# Patient Record
Sex: MALE | Race: WHITE | NOT HISPANIC OR LATINO | Employment: OTHER | URBAN - METROPOLITAN AREA
[De-identification: names, ages, dates, MRNs, and addresses within clinical notes are randomized per-mention and may not be internally consistent; named-entity substitution may affect disease eponyms.]

---

## 2024-04-24 ENCOUNTER — HOSPITAL ENCOUNTER (EMERGENCY)
Facility: HOSPITAL | Age: 74
Discharge: HOME | End: 2024-04-24
Attending: EMERGENCY MEDICINE | Admitting: EMERGENCY MEDICINE
Payer: MEDICARE

## 2024-04-24 ENCOUNTER — APPOINTMENT (OUTPATIENT)
Dept: CT IMAGING | Facility: HOSPITAL | Age: 74
End: 2024-04-24
Payer: MEDICARE

## 2024-04-24 ENCOUNTER — APPOINTMENT (OUTPATIENT)
Dept: GENERAL RADIOLOGY | Facility: HOSPITAL | Age: 74
End: 2024-04-24
Payer: MEDICARE

## 2024-04-24 VITALS
TEMPERATURE: 97.7 F | SYSTOLIC BLOOD PRESSURE: 135 MMHG | RESPIRATION RATE: 18 BRPM | DIASTOLIC BLOOD PRESSURE: 87 MMHG | WEIGHT: 170 LBS | HEIGHT: 71 IN | BODY MASS INDEX: 23.8 KG/M2 | HEART RATE: 85 BPM | OXYGEN SATURATION: 98 %

## 2024-04-24 DIAGNOSIS — R55 NEAR SYNCOPE: Primary | ICD-10-CM

## 2024-04-24 DIAGNOSIS — N17.9 ACUTE KIDNEY INJURY: ICD-10-CM

## 2024-04-24 LAB
ALBUMIN SERPL-MCNC: 3.9 G/DL (ref 3.5–5.2)
ALBUMIN/GLOB SERPL: 1.3 G/DL
ALP SERPL-CCNC: 69 U/L (ref 39–117)
ALT SERPL W P-5'-P-CCNC: 9 U/L (ref 1–41)
ANION GAP SERPL CALCULATED.3IONS-SCNC: 10 MMOL/L (ref 5–15)
AST SERPL-CCNC: 16 U/L (ref 1–40)
BACTERIA UR QL AUTO: ABNORMAL /HPF
BASOPHILS # BLD AUTO: 0.05 10*3/MM3 (ref 0–0.2)
BASOPHILS NFR BLD AUTO: 0.4 % (ref 0–1.5)
BILIRUB SERPL-MCNC: 0.3 MG/DL (ref 0–1.2)
BILIRUB UR QL STRIP: NEGATIVE
BUN SERPL-MCNC: 33 MG/DL (ref 8–23)
BUN/CREAT SERPL: 19.1 (ref 7–25)
CALCIUM SPEC-SCNC: 9.9 MG/DL (ref 8.6–10.5)
CHLORIDE SERPL-SCNC: 108 MMOL/L (ref 98–107)
CLARITY UR: CLEAR
CO2 SERPL-SCNC: 25 MMOL/L (ref 22–29)
COLOR UR: YELLOW
CREAT SERPL-MCNC: 1.73 MG/DL (ref 0.76–1.27)
DEPRECATED RDW RBC AUTO: 44.8 FL (ref 37–54)
EGFRCR SERPLBLD CKD-EPI 2021: 41.7 ML/MIN/1.73
EOSINOPHIL # BLD AUTO: 0.02 10*3/MM3 (ref 0–0.4)
EOSINOPHIL NFR BLD AUTO: 0.2 % (ref 0.3–6.2)
ERYTHROCYTE [DISTWIDTH] IN BLOOD BY AUTOMATED COUNT: 12.3 % (ref 12.3–15.4)
GLOBULIN UR ELPH-MCNC: 3.1 GM/DL
GLUCOSE SERPL-MCNC: 117 MG/DL (ref 65–99)
GLUCOSE UR STRIP-MCNC: NEGATIVE MG/DL
HCT VFR BLD AUTO: 40.8 % (ref 37.5–51)
HGB BLD-MCNC: 12.8 G/DL (ref 13–17.7)
HGB UR QL STRIP.AUTO: ABNORMAL
HOLD SPECIMEN: NORMAL
HYALINE CASTS UR QL AUTO: ABNORMAL /LPF
IMM GRANULOCYTES # BLD AUTO: 0.08 10*3/MM3 (ref 0–0.05)
IMM GRANULOCYTES NFR BLD AUTO: 0.6 % (ref 0–0.5)
KETONES UR QL STRIP: ABNORMAL
LEUKOCYTE ESTERASE UR QL STRIP.AUTO: NEGATIVE
LYMPHOCYTES # BLD AUTO: 1.01 10*3/MM3 (ref 0.7–3.1)
LYMPHOCYTES NFR BLD AUTO: 7.8 % (ref 19.6–45.3)
MAGNESIUM SERPL-MCNC: 2.1 MG/DL (ref 1.6–2.4)
MCH RBC QN AUTO: 31.1 PG (ref 26.6–33)
MCHC RBC AUTO-ENTMCNC: 31.4 G/DL (ref 31.5–35.7)
MCV RBC AUTO: 99 FL (ref 79–97)
MONOCYTES # BLD AUTO: 1.06 10*3/MM3 (ref 0.1–0.9)
MONOCYTES NFR BLD AUTO: 8.2 % (ref 5–12)
NEUTROPHILS NFR BLD AUTO: 10.68 10*3/MM3 (ref 1.7–7)
NEUTROPHILS NFR BLD AUTO: 82.8 % (ref 42.7–76)
NITRITE UR QL STRIP: NEGATIVE
NRBC BLD AUTO-RTO: 0 /100 WBC (ref 0–0.2)
PH UR STRIP.AUTO: 6 [PH] (ref 5–8)
PLATELET # BLD AUTO: 247 10*3/MM3 (ref 140–450)
PMV BLD AUTO: 9.6 FL (ref 6–12)
POTASSIUM SERPL-SCNC: 4.8 MMOL/L (ref 3.5–5.2)
PROT SERPL-MCNC: 7 G/DL (ref 6–8.5)
PROT UR QL STRIP: ABNORMAL
QT INTERVAL: 346 MS
QTC INTERVAL: 448 MS
RBC # BLD AUTO: 4.12 10*6/MM3 (ref 4.14–5.8)
RBC # UR STRIP: ABNORMAL /HPF
REF LAB TEST METHOD: ABNORMAL
SODIUM SERPL-SCNC: 143 MMOL/L (ref 136–145)
SP GR UR STRIP: 1.02 (ref 1–1.03)
SQUAMOUS #/AREA URNS HPF: ABNORMAL /HPF
TROPONIN T SERPL HS-MCNC: 21 NG/L
UROBILINOGEN UR QL STRIP: ABNORMAL
WBC # UR STRIP: ABNORMAL /HPF
WBC NRBC COR # BLD AUTO: 12.9 10*3/MM3 (ref 3.4–10.8)
WHOLE BLOOD HOLD COAG: NORMAL
WHOLE BLOOD HOLD SPECIMEN: NORMAL

## 2024-04-24 PROCEDURE — 71045 X-RAY EXAM CHEST 1 VIEW: CPT

## 2024-04-24 PROCEDURE — 36415 COLL VENOUS BLD VENIPUNCTURE: CPT

## 2024-04-24 PROCEDURE — 81001 URINALYSIS AUTO W/SCOPE: CPT

## 2024-04-24 PROCEDURE — 84484 ASSAY OF TROPONIN QUANT: CPT

## 2024-04-24 PROCEDURE — 70450 CT HEAD/BRAIN W/O DYE: CPT

## 2024-04-24 PROCEDURE — 93005 ELECTROCARDIOGRAM TRACING: CPT | Performed by: EMERGENCY MEDICINE

## 2024-04-24 PROCEDURE — 85025 COMPLETE CBC W/AUTO DIFF WBC: CPT

## 2024-04-24 PROCEDURE — 99284 EMERGENCY DEPT VISIT MOD MDM: CPT

## 2024-04-24 PROCEDURE — 83735 ASSAY OF MAGNESIUM: CPT

## 2024-04-24 PROCEDURE — 93005 ELECTROCARDIOGRAM TRACING: CPT

## 2024-04-24 PROCEDURE — 80053 COMPREHEN METABOLIC PANEL: CPT

## 2024-04-24 RX ORDER — SODIUM CHLORIDE 0.9 % (FLUSH) 0.9 %
10 SYRINGE (ML) INJECTION AS NEEDED
Status: DISCONTINUED | OUTPATIENT
Start: 2024-04-24 | End: 2024-04-25 | Stop reason: HOSPADM

## 2024-04-25 NOTE — ED PROVIDER NOTES
Subjective   History of Present Illness  Is a 73-year-old male presented to the emergency department with a near syncopal episode.  The patient states that this happened about an hour and a half ago.  He was going to the restroom when he got very lightheaded upon standing.  He felt he was going to pass out.  The patient states that he was working out in the yard today and feels like he overdid it.  Patient states that he just feels generally malaised at this time.  He is not having any headache or change in vision.  No focal weakness.  No fevers or chills.  No chest pain or shortness of breath.  No abdominal pain or vomiting.    History provided by:  Patient   used: No        Review of Systems   Constitutional:  Positive for fatigue. Negative for chills and fever.   HENT:  Negative for congestion, ear pain and sore throat.    Eyes:  Negative for visual disturbance.   Respiratory:  Negative for shortness of breath.    Cardiovascular:  Negative for chest pain.   Gastrointestinal:  Negative for abdominal pain.   Genitourinary:  Negative for difficulty urinating.   Musculoskeletal:  Negative for arthralgias.   Skin:  Negative for rash.   Neurological:  Negative for dizziness, weakness and numbness.   Psychiatric/Behavioral:  Negative for agitation.        No past medical history on file.    No Known Allergies    No past surgical history on file.    No family history on file.    Social History     Socioeconomic History    Marital status:            Objective   Physical Exam  Vitals and nursing note reviewed.   Constitutional:       General: He is not in acute distress.     Appearance: He is not ill-appearing or toxic-appearing.   HENT:      Mouth/Throat:      Pharynx: No posterior oropharyngeal erythema.   Eyes:      Extraocular Movements: Extraocular movements intact.      Pupils: Pupils are equal, round, and reactive to light.   Cardiovascular:      Rate and Rhythm: Normal rate and regular  rhythm.   Pulmonary:      Effort: Pulmonary effort is normal. No respiratory distress.   Abdominal:      General: Abdomen is flat. There is no distension.      Palpations: There is no mass.      Tenderness: There is no abdominal tenderness. There is no guarding or rebound.   Musculoskeletal:         General: No deformity. Normal range of motion.   Neurological:      General: No focal deficit present.      Mental Status: He is alert.      Sensory: No sensory deficit.      Motor: No weakness.         ECG 12 Lead      Date/Time: 4/24/2024 11:08 PM    Performed by: José Antonio Ji MD  Authorized by: José Antonio Ji MD  Interpreted by ED physician  Comparison: compared with previous ECG   Similar to previous ECG  Rhythm: sinus tachycardia  Rate: tachycardic  BPM: 101  QRS axis: normal  Conduction: conduction normal  ST Segments: ST segments normal  Other: no other findings  Clinical impression: normal ECG  Comments: Interpretation:  EKG was directly visualized by myself, interpretations as documented in hospital course.               ED Course  ED Course as of 04/24/24 2311 Wed Apr 24, 2024   2310 BP: 135/87 [JK]   2310 Temp: 97.7 °F (36.5 °C) [JK]   2310 Heart Rate: 85 [JK]   2310 Temp src: Oral [JK]   2310 Resp: 18 [JK]   2310 SpO2: 98 % [JK]   2310 Device (Oxygen Therapy): room air  Interpretation:  Patient's repeat vitals, telemetry tracing, and pulse oximetry tracing were directly viewed and interpreted by myself.  Normal sinus rhythm [JK]   2310 Urinalysis With Microscopic If Indicated (No Culture) - Urine, Clean Catch(!) [JK]   2310 Single High Sensitivity Troponin T [JK]   2310 Comprehensive Metabolic Panel(!) [JK]   2310 Magnesium [JK]   2310 Urinalysis, Microscopic Only - Urine, Clean Catch(!) [JK]   2310 CBC & Differential(!)  Interpretation:  Laboratory studies were reviewed and interpreted directly by myself.  CBC was unremarkable, urinalysis showed some contamination, magnesium normal, CMP shows  a minor acute kidney injury with a BUN of 33 and creatinine 1.73 [JK]   2310 CT Head Without Contrast [JK]   2310 XR Chest 1 View  Interpretation:  Imaging was directly visualized by myself, per my interpretations, chest x-ray was normal.  CT of the head was unremarkable. [JK]   2310 On reevaluation, the patient is feeling better.  He is tolerating oral intake.  His symptoms are consistent with near syncope, likely secondary to orthostasis.  We did have discussion about appropriate position changes as well as adequate rehydration.  Patient need to follow-up with PCP in 48 hours.  Given strict return precautions.  Verbalized understanding. [JK]   2311 I had a discussion with the patient/family regarding diagnosis, diagnostic results, treatment plan, and medications. The patient/family indicated understanding of these instructions. I spent adequate time at the bedside prior to discharge necessary to discuss the aftercare instructions, giving patient education, providing explanations of the results of our evaluations/findings, and my decision making to assure that the patient/family understand the plan of care. Time was allotted to answer questions at that time and throughout the ED course. Patient is required to maintain timely follow up, as discussed. I also discussed the potential for the development of an acute emergent condition requiring further evaluation, return to the ER, admission, or even surgical intervention.  I encouraged the patient to return to the emergency department immediately for any concerns, worsening symptoms, new complaints, or if symptoms persist and they are unable to seek follow-up in a timely fashion. The patient/family expressed understanding and agreement with this plan    Shared decision making:   After full review of the patient's clinical presentation, review of any work-up including but not limited to laboratory studies and radiology obtained, I had a discussion with the patient.   Treatment options were discussed as well as the risks, benefits and consequences.  I discussed all findings with the patient and family members if available.  During the discussion, treatment goals were understood by all as well as any misconceptions which were addressed with the patient.  Ample time was given for any questions they may have had.  They are in agreement with the treatment plan as well as final disposition. [JK]      ED Course User Index  [JK] José Antonio Ji MD                                             Medical Decision Making  This is a 73-year-old male presented to the emergency department with a near syncopal episode.  This appears to be vasovagal in nature, possibly orthostatic.  Patient was doing some increased exertion today and working outside quite a bit.  Overall the patient appears normal at this time.  He is asymptomatic.  He is hemodynamically stable.  Nontoxic.  Afebrile.  IV access was established and patient.  Placed on continuous telemetry monitoring.  Workup initiated.      Differential diagnosis: Vasovagal episode, near syncope, orthostasis, dysrhythmia, acute kidney injury, electrolyte abnormality      Amount and/or Complexity of Data Reviewed  External Data Reviewed: labs, radiology, ECG and notes.     Details: External laboratories, imaging as well as notes were reviewed personally by myself.  All relevant studies were used to guide decision making.     Date of previous record: 4/4/2024    Source of note: Oncology    Summary: Patient was seen evaluated for chronic prostate cancer.  I did review basic laboratory studies on file as well as previous chest x-ray, CT abdomen pelvis and EKG.  Records reviewed    Labs: ordered. Decision-making details documented in ED Course.  Radiology: ordered and independent interpretation performed. Decision-making details documented in ED Course.  ECG/medicine tests: ordered and independent interpretation performed. Decision-making details  documented in ED Course.    Risk  Prescription drug management.        Final diagnoses:   Near syncope   Acute kidney injury       ED Disposition  ED Disposition       ED Disposition   Discharge    Condition   Stable    Comment   --               PATIENT CONNECTION - [place] [id number]  [phone]  Call in 1 day           Medication List      No changes were made to your prescriptions during this visit.            José Antonio Ji MD  04/24/24 [id number]

## 2024-05-29 ENCOUNTER — OFFICE VISIT (OUTPATIENT)
Dept: FAMILY MEDICINE CLINIC | Facility: CLINIC | Age: 74
End: 2024-05-29
Payer: MEDICARE

## 2024-05-29 VITALS
HEART RATE: 86 BPM | HEIGHT: 71 IN | OXYGEN SATURATION: 100 % | DIASTOLIC BLOOD PRESSURE: 76 MMHG | BODY MASS INDEX: 24.36 KG/M2 | WEIGHT: 174 LBS | TEMPERATURE: 97.9 F | RESPIRATION RATE: 16 BRPM | SYSTOLIC BLOOD PRESSURE: 116 MMHG

## 2024-05-29 DIAGNOSIS — D23.5 DERMOID CYST OF SKIN OF BACK: Primary | ICD-10-CM

## 2024-05-29 PROCEDURE — 99203 OFFICE O/P NEW LOW 30 MIN: CPT | Performed by: NURSE PRACTITIONER

## 2024-05-29 PROCEDURE — 1160F RVW MEDS BY RX/DR IN RCRD: CPT | Performed by: NURSE PRACTITIONER

## 2024-05-29 PROCEDURE — 1159F MED LIST DOCD IN RCRD: CPT | Performed by: NURSE PRACTITIONER

## 2024-05-29 RX ORDER — RIVAROXABAN 20 MG/1
20 TABLET, FILM COATED ORAL EVERY EVENING
COMMUNITY

## 2024-05-29 RX ORDER — ABIRATERONE ACETATE 250 MG/1
TABLET ORAL
COMMUNITY

## 2024-05-29 RX ORDER — PREDNISONE 5 MG/1
5 TABLET ORAL 2 TIMES DAILY
COMMUNITY

## 2024-05-29 RX ORDER — PENICILLIN V POTASSIUM 500 MG/1
1 TABLET ORAL 3 TIMES DAILY
COMMUNITY
Start: 2024-04-08 | End: 2024-06-16

## 2024-05-29 NOTE — PROGRESS NOTES
"       New Patient History and Physical      Referring Physician: No ref. provider found    Chief Complaint:    Chief Complaint   Patient presents with    Cyst     Pt is here to establish care and also has a cyst on back that needs to be drained.          History of Present Illness:   Ghassan Nichole is a 73 y.o. male who presents today as a new patient.     Cyst left thoracic back.     Appeared about 1 month ago. Denies drainage, redness, warmth, swelling. Presence of multiple cysts over the year that have had to be removed surgically.     Subjective     Review of Systems   Musculoskeletal:         Nodule/cyst thoracic back   Skin:  Negative for color change and wound.        The following portions of the patient's history were reviewed and updated as appropriate: allergies, current medications, past family history, past medical history, past social history, past surgical history and problem list.    Past Medical History: History reviewed. No pertinent past medical history.    Past Surgical History:History reviewed. No pertinent surgical history.    Family History: family history is not on file.    Social History:  reports that he has never smoked. He has never been exposed to tobacco smoke. He has never used smokeless tobacco. Alcohol use questions deferred to the physician. He reports that he does not use drugs.    Medications:    Current Outpatient Medications:     abiraterone acetate (ZYTIGA) 250 MG tablet, Take  by mouth., Disp: , Rfl:     predniSONE (DELTASONE) 5 MG tablet, Take 1 tablet by mouth 2 (Two) Times a Day., Disp: , Rfl:     Xarelto 20 MG tablet, Take 1 tablet by mouth Every Evening., Disp: , Rfl:     Allergies:  No Known Allergies    Objective     Vital Signs:   /76   Pulse 86   Temp 97.9 °F (36.6 °C)   Resp 16   Ht 180.3 cm (71\")   Wt 78.9 kg (174 lb)   SpO2 100%   BMI 24.27 kg/m²      BMI is within normal parameters. No other follow-up for BMI required.          Physical " Exam:  Physical Exam  Vitals and nursing note reviewed.   Cardiovascular:      Rate and Rhythm: Normal rate and regular rhythm.   Pulmonary:      Effort: Pulmonary effort is normal.      Breath sounds: Normal breath sounds.   Musculoskeletal:      Thoracic back: Tenderness present. No swelling or edema.        Back:       Comments: Round, mobile, firm nodule noted left thoracic back   Neurological:      Mental Status: He is alert and oriented to person, place, and time.   Psychiatric:         Mood and Affect: Mood normal.         Behavior: Behavior normal.         Assessment / Plan     Assessment/Plan:   Diagnoses and all orders for this visit:    1. Dermoid cyst of skin of back (Primary)  -     Ambulatory Referral to Dermatology    Patient was encouraged to keep me informed of any acute changes, lack of improvement, or any new concerning symptoms.      Discussion Summary:  Discussed plan of care in detail with pt today; pt verb understanding and agrees.        I have reviewed and updated all copied forward information, as appropriate.  I attest to the accuracy and relevance of any unchanged information.        Follow up:  Return if symptoms worsen or fail to improve.     Patient Education:  There are no Patient Instructions on file for this visit.    MER[facility] Garrett  06/16/24  17:43 EDT    Please note that portions of this note may have been completed with a voice recognition program.

## 2024-05-31 ENCOUNTER — PATIENT ROUNDING (BHMG ONLY) (OUTPATIENT)
Dept: FAMILY MEDICINE CLINIC | Facility: CLINIC | Age: 74
End: 2024-05-31
Payer: MEDICARE

## 2024-09-27 ENCOUNTER — FLU SHOT (OUTPATIENT)
Dept: FAMILY MEDICINE CLINIC | Facility: CLINIC | Age: 74
End: 2024-09-27
Payer: MEDICARE

## 2024-09-27 DIAGNOSIS — Z23 FLU VACCINE NEED: Primary | ICD-10-CM

## 2024-09-27 PROCEDURE — G0008 ADMIN INFLUENZA VIRUS VAC: HCPCS | Performed by: NURSE PRACTITIONER

## 2024-09-27 PROCEDURE — 90662 IIV NO PRSV INCREASED AG IM: CPT | Performed by: NURSE PRACTITIONER

## 2025-02-10 ENCOUNTER — OFFICE VISIT (OUTPATIENT)
Dept: FAMILY MEDICINE CLINIC | Facility: CLINIC | Age: 75
End: 2025-02-10
Payer: MEDICARE

## 2025-02-10 VITALS
BODY MASS INDEX: 21.87 KG/M2 | SYSTOLIC BLOOD PRESSURE: 122 MMHG | OXYGEN SATURATION: 95 % | HEIGHT: 71 IN | WEIGHT: 156.2 LBS | RESPIRATION RATE: 16 BRPM | HEART RATE: 75 BPM | TEMPERATURE: 98.5 F | DIASTOLIC BLOOD PRESSURE: 78 MMHG

## 2025-02-10 DIAGNOSIS — G89.29 CHRONIC PAIN IN LEFT SHOULDER: ICD-10-CM

## 2025-02-10 DIAGNOSIS — R29.898 WEAKNESS OF LEFT SHOULDER: ICD-10-CM

## 2025-02-10 DIAGNOSIS — C79.51 PROSTATE CANCER METASTATIC TO BONE: ICD-10-CM

## 2025-02-10 DIAGNOSIS — C61 PROSTATE CANCER METASTATIC TO BONE: ICD-10-CM

## 2025-02-10 DIAGNOSIS — M25.512 CHRONIC PAIN IN LEFT SHOULDER: ICD-10-CM

## 2025-02-10 DIAGNOSIS — M25.512 ACUTE PAIN OF LEFT SHOULDER: Primary | ICD-10-CM

## 2025-02-10 PROCEDURE — 1125F AMNT PAIN NOTED PAIN PRSNT: CPT | Performed by: FAMILY MEDICINE

## 2025-02-10 PROCEDURE — 1159F MED LIST DOCD IN RCRD: CPT | Performed by: FAMILY MEDICINE

## 2025-02-10 PROCEDURE — 1160F RVW MEDS BY RX/DR IN RCRD: CPT | Performed by: FAMILY MEDICINE

## 2025-02-10 PROCEDURE — 99214 OFFICE O/P EST MOD 30 MIN: CPT | Performed by: FAMILY MEDICINE

## 2025-02-10 PROCEDURE — G2211 COMPLEX E/M VISIT ADD ON: HCPCS | Performed by: FAMILY MEDICINE

## 2025-02-10 RX ORDER — LEUPROLIDE ACETATE 45 MG
45 KIT SUBCUTANEOUS
COMMUNITY
Start: 2024-11-20 | End: 2025-11-15

## 2025-02-10 RX ORDER — CYCLOBENZAPRINE HCL 5 MG
5 TABLET ORAL 3 TIMES DAILY PRN
Qty: 30 TABLET | Refills: 0 | Status: SHIPPED | OUTPATIENT
Start: 2025-02-10

## 2025-02-10 NOTE — PROGRESS NOTES
"    Office Note     Name: Ghassan Nichole  : 1950   MRN: [id number]     Chief Complaint:  Back Pain (Pt has had upper extremity pain since Wednesday with no notable injury. )    Subjective     History of Present Illness:  Ghassan Nichole is a 74 y.o. male who presents today for back pain/left shoulder pain.        The patient presents with back pain, accompanied by his wife.    He reports a localized knot-like discomfort in his back since Wednesday, with no specific precipitating activity. He has not sought prior medical attention for this issue. He also reports a popping sensation in his shoulder when lifting his arm, present for several years. He has not used OTC analgesics like Tylenol or ibuprofen. His wife confirms no contraindications to ibuprofen. Certain positions exacerbate the pain, while others alleviate it. He has used a heating pad and Icy Hot with some relief.    Supplemental Information  He has a scab in the middle of his back from an oil gland, previously removed. He has a history of prostate cancer with bone metastasis, location uncertain.    I have reviewed the following portions of the patient's history and these were updated and discussed with the patient as appropriate: past family history, past medical history, past social history, past surgical history, and problem list.     Objective     Vital Signs  /78   Pulse 75   Temp 98.5 °F (36.9 °C) (Temporal)   Resp 16   Ht 180.3 cm (71\")   Wt 70.9 kg (156 lb 3.2 oz)   SpO2 95%   BMI 21.79 kg/m²   Estimated body mass index is 21.79 kg/m² as calculated from the following:    Height as of this encounter: 180.3 cm (71\").    Weight as of this encounter: 70.9 kg (156 lb 3.2 oz).    Physical Exam  Vitals reviewed.   Constitutional:       General: He is not in acute distress.     Appearance: Normal appearance. He is not ill-appearing.   HENT:      Head: Normocephalic.   Eyes:      Extraocular Movements: Extraocular movements intact. "   Cardiovascular:      Rate and Rhythm: Normal rate.   Pulmonary:      Effort: Pulmonary effort is normal.      Breath sounds: Normal breath sounds.   Musculoskeletal:      Right shoulder: Normal.      Left shoulder: Tenderness present. Decreased range of motion. Decreased strength.        Back:    Neurological:      Mental Status: He is alert and oriented to person, place, and time.   Psychiatric:         Mood and Affect: Mood normal.         Behavior: Behavior normal.            Assessment and Plan     Diagnoses and all orders for this visit:    1. Acute pain of left shoulder (Primary)  -     XR Shoulder 2+ View Left (In Office)  -     cyclobenzaprine (FLEXERIL) 5 MG tablet; Take 1 tablet by mouth 3 (Three) Times a Day As Needed for Muscle Spasms.  Dispense: 30 tablet; Refill: 0    2. Chronic pain in left shoulder  -     XR Shoulder 2+ View Left (In Office)    3. Weakness of left shoulder  -     XR Shoulder 2+ View Left (In Office)    4. Prostate cancer metastatic to bone  -     XR Shoulder 2+ View Left (In Office)    Acute on chronic left shoulder pain with left shoulder weakness.  Patient has not tried ibuprofen or acetaminophen.  Has not used heating pad or ice yet.  Based on physical exam patient does have significant weakness of left shoulder which patient reports is chronic  X-ray today for acute on chronic left shoulder pain given patient's history of prostate cancer with bone mets.  Per chart review unsure of mets site and patient and wife do not recall.  Radiologist official read notes evidence of full-thickness rotator cuff tear with narrowing of subacromial space as well as moderate glenohumeral and AC joint osteoarthrosis with no acute fracture.  Will start conservative measures for acute pain including ibuprofen (which patient has at home), heating pad, muscle relaxer, and PT exercises printed on AVS    Discussion Summary:     Discussed plan of care in detail with patient today. Patient was encouraged  to keep me informed of any acute changes, lack of improvement, or any new concerning symptoms.  Patient is also aware of reasons to seek emergent care. Patient verbalized understanding and agrees with plan of care.    This visit was billed based on MDM.    Follow Up  Return in 2 weeks (on 2/24/2025) for Medicare Wellness.    Patient or patient representative verbalized consent for the use of Ambient Listening during the visit with  Jovi Brito MD for chart documentation. 2/11/2025  12:19 EST    Please note that portions of this note may have been completed with a voice recognition program.     Jovi Brito MD, MPH  [facility] [place] [facility]

## 2025-02-11 PROBLEM — M25.512 CHRONIC PAIN IN LEFT SHOULDER: Status: ACTIVE | Noted: 2025-02-11

## 2025-02-11 PROBLEM — G89.29 CHRONIC PAIN IN LEFT SHOULDER: Status: ACTIVE | Noted: 2025-02-11

## 2025-02-11 PROBLEM — C79.51 PROSTATE CANCER METASTATIC TO BONE: Status: ACTIVE | Noted: 2025-02-11

## 2025-02-11 PROBLEM — C61 PROSTATE CANCER METASTATIC TO BONE: Status: ACTIVE | Noted: 2025-02-11

## 2025-02-25 ENCOUNTER — OFFICE VISIT (OUTPATIENT)
Dept: FAMILY MEDICINE CLINIC | Facility: CLINIC | Age: 75
End: 2025-02-25
Payer: MEDICARE

## 2025-02-25 VITALS
SYSTOLIC BLOOD PRESSURE: 118 MMHG | WEIGHT: 160.2 LBS | HEIGHT: 71 IN | BODY MASS INDEX: 22.43 KG/M2 | TEMPERATURE: 97.5 F | OXYGEN SATURATION: 98 % | RESPIRATION RATE: 16 BRPM | HEART RATE: 73 BPM | DIASTOLIC BLOOD PRESSURE: 78 MMHG

## 2025-02-25 DIAGNOSIS — G89.29 CHRONIC PAIN IN LEFT SHOULDER: ICD-10-CM

## 2025-02-25 DIAGNOSIS — C61 PROSTATE CANCER METASTATIC TO BONE: ICD-10-CM

## 2025-02-25 DIAGNOSIS — Z00.00 MEDICARE ANNUAL WELLNESS VISIT, SUBSEQUENT: Primary | ICD-10-CM

## 2025-02-25 DIAGNOSIS — I82.409 RECURRENT DEEP VEIN THROMBOSIS: ICD-10-CM

## 2025-02-25 DIAGNOSIS — M25.512 CHRONIC PAIN IN LEFT SHOULDER: ICD-10-CM

## 2025-02-25 DIAGNOSIS — Z23 NEED FOR VACCINATION: ICD-10-CM

## 2025-02-25 DIAGNOSIS — C79.51 PROSTATE CANCER METASTATIC TO BONE: ICD-10-CM

## 2025-02-25 DIAGNOSIS — Z13.220 LIPID SCREENING: ICD-10-CM

## 2025-02-25 PROCEDURE — 1160F RVW MEDS BY RX/DR IN RCRD: CPT | Performed by: FAMILY MEDICINE

## 2025-02-25 PROCEDURE — 1125F AMNT PAIN NOTED PAIN PRSNT: CPT | Performed by: FAMILY MEDICINE

## 2025-02-25 PROCEDURE — 1170F FXNL STATUS ASSESSED: CPT | Performed by: FAMILY MEDICINE

## 2025-02-25 PROCEDURE — G0439 PPPS, SUBSEQ VISIT: HCPCS | Performed by: FAMILY MEDICINE

## 2025-02-25 PROCEDURE — 90677 PCV20 VACCINE IM: CPT | Performed by: FAMILY MEDICINE

## 2025-02-25 PROCEDURE — G0009 ADMIN PNEUMOCOCCAL VACCINE: HCPCS | Performed by: FAMILY MEDICINE

## 2025-02-25 PROCEDURE — 1159F MED LIST DOCD IN RCRD: CPT | Performed by: FAMILY MEDICINE

## 2025-02-25 PROCEDURE — G2211 COMPLEX E/M VISIT ADD ON: HCPCS | Performed by: FAMILY MEDICINE

## 2025-02-25 PROCEDURE — 99214 OFFICE O/P EST MOD 30 MIN: CPT | Performed by: FAMILY MEDICINE

## 2025-02-25 NOTE — PROGRESS NOTES
Subjective   The ABCs of the Annual Wellness Visit  Medicare Wellness Visit      Ghassan Nichole is a 74 y.o. patient who presents for a Medicare Wellness Visit.    The following portions of the patient's history were reviewed and   updated as appropriate: allergies, current medications, past family history, past medical history, past social history, past surgical history, and problem list.    Compared to one year ago, the patient's physical   health is the same.  Compared to one year ago, the patient's mental   health is the same.    Recent Hospitalizations:  He was not admitted to the hospital during the last year.     Current Medical Providers:  Patient Care Team:  Jovi Brito MD as PCP - General (Family Medicine)    Outpatient Medications Prior to Visit   Medication Sig Dispense Refill    abiraterone acetate (ZYTIGA) 250 MG tablet Take  by mouth.      leuprolide (Eligard) 45 MG injection Inject 45 mg under the skin into the appropriate area as directed Every 6 (Six) Months.      predniSONE (DELTASONE) 5 MG tablet Take 1 tablet by mouth 2 (Two) Times a Day.      Xarelto 20 MG tablet Take 1 tablet by mouth Every Evening.      cyclobenzaprine (FLEXERIL) 5 MG tablet Take 1 tablet by mouth 3 (Three) Times a Day As Needed for Muscle Spasms. (Patient not taking: Reported on 2/25/2025) 30 tablet 0     No facility-administered medications prior to visit.     No opioid medication identified on active medication list. I have reviewed chart for other potential  high risk medication/s and harmful drug interactions in the elderly.      Aspirin is not on active medication list.  Aspirin use is not indicated based on review of current medical condition/s. Risk of harm outweighs potential benefits.  .    Patient Active Problem List   Diagnosis    Prostate cancer metastatic to bone    Chronic pain in left shoulder     Advance Care Planning Advance Directive is not on file.  ACP discussion was held with the patient during this  "visit. Patient does not have an advance directive, information provided.            Objective   Vitals:    25 0830   BP: 118/78   Pulse: 73   Resp: 16   Temp: 97.5 °F (36.4 °C)   TempSrc: Oral   SpO2: 98%   Weight: 72.7 kg (160 lb 3.2 oz)   Height: 180.3 cm (71\")   PainSc: 2    PainLoc: Generalized       Estimated body mass index is 22.34 kg/m² as calculated from the following:    Height as of this encounter: 180.3 cm (71\").    Weight as of this encounter: 72.7 kg (160 lb 3.2 oz).    BMI is within normal parameters. No other follow-up for BMI required.           Does the patient have evidence of cognitive impairment? No                                                                                                Health  Risk Assessment    Smoking Status:  Social History     Tobacco Use   Smoking Status Never    Passive exposure: Never   Smokeless Tobacco Never     Alcohol Consumption:  Social History     Substance and Sexual Activity   Alcohol Use Not Currently       Fall Risk Screen  STEADI Fall Risk Assessment was completed, and patient is at LOW risk for falls.Assessment completed on:2025    Depression Screening   Little interest or pleasure in doing things? Not at all   Feeling down, depressed, or hopeless? Not at all   PHQ-2 Total Score 0      Health Habits and Functional and Cognitive Screenin/25/2025     8:32 AM   Functional & Cognitive Status   Do you have difficulty preparing food and eating? No   Do you have difficulty bathing yourself, getting dressed or grooming yourself? No   Do you have difficulty using the toilet? No   Do you have difficulty moving around from place to place? No   Do you have trouble with steps or getting out of a bed or a chair? No   Current Diet Well Balanced Diet   Dental Exam Up to date   Eye Exam Up to date   Exercise (times per week) 7 times per week   Current Exercises Include Walking;Other;Gardening;Yard Work;House Cleaning   Do you need help using the " phone?  No   Are you deaf or do you have serious difficulty hearing?  No   Do you need help to go to places out of walking distance? No   Do you need help shopping? No   Do you need help preparing meals?  No   Do you need help with housework?  No   Do you need help with laundry? No   Do you need help taking your medications? No   Do you need help managing money? No   Do you ever drive or ride in a car without wearing a seat belt? No   Have you felt unusual stress, anger or loneliness in the last month? No   Who do you live with? Spouse   If you need help, do you have trouble finding someone available to you? No   Have you been bothered in the last four weeks by sexual problems? No   Do you have difficulty concentrating, remembering or making decisions? No           Age-appropriate Screening Schedule:  Refer to the list below for future screening recommendations based on patient's age, sex and/or medical conditions. Orders for these recommended tests are listed in the plan section. The patient has been provided with a written plan.    Health Maintenance List  Health Maintenance   Topic Date Due    TDAP/TD VACCINES (1 - Tdap) Never done    COLORECTAL CANCER SCREENING  Never done    ZOSTER VACCINE (1 of 2) Never done    HEPATITIS C SCREENING  Never done    COVID-19 Vaccine (2 - 2024-25 season) 09/01/2024    ANNUAL WELLNESS VISIT  02/25/2026    INFLUENZA VACCINE  Completed    Pneumococcal Vaccine 50+  Completed                                                                                                                                                CMS Preventative Services Quick Reference  Risk Factors Identified During Encounter  Immunizations Discussed/Encouraged: Prevnar 20 (Pneumococcal 20-valent conjugate) and Shingrix  Dental Screening Recommended  Vision Screening Recommended    The above risks/problems have been discussed with the patient.  Pertinent information has been shared with the patient in the After  "Visit Summary.  An After Visit Summary and PPPS were made available to the patient.    Follow Up:   Next Medicare Wellness visit to be scheduled in 1 year.         Additional E&M Note during same encounter follows:  Patient has additional, significant, and separately identifiable condition(s)/problem(s) that require work above and beyond the Medicare Wellness Visit     Chief Complaint  Medicare Wellness-subsequent (Pt is here for medicare wellness no new concerns shoulder pain is doing better. )    Subjective   HPI  Beto is also being seen today for additional medical problem/s.    Chronic pain in left shoulder: Reports resolution of acute pain that was bothering him.  Reports intermittent chronic pain that has been ongoing for the last few years.  Does have decreased strength but range of motion is adequate for ADLs. X-ray of the left shoulder done at last visit showed evidence of full-thickness rotator cuff tear with narrowing of subacromial space.    Prostate cancer metastatic to bone: Follows with Dr. Carcamo, oncologist at [facility] in [place], [place] as well as Dr. Benjamin, urology in [place], [place] where patient previously resided.  Currently on abiraterone and prednisone as well as leuprolide with urology.  Will be getting labs done today for appointment next week.  Previous lab work did show hyperkalemia and KHADIJAH that did resolve on repeat testing.    History of recurrent thrombosis: Currently on Xarelto. No issues. No bleeding problems or other adverse effects        Objective   Vital Signs:  /78   Pulse 73   Temp 97.5 °F (36.4 °C) (Oral)   Resp 16   Ht 180.3 cm (71\")   Wt 72.7 kg (160 lb 3.2 oz)   SpO2 98%   BMI 22.34 kg/m²   Physical Exam  Vitals reviewed.   Constitutional:       General: He is not in acute distress.     Appearance: Normal appearance. He is not ill-appearing.   HENT:      Head: Normocephalic and atraumatic.      Right Ear: Tympanic membrane, ear canal and external ear " normal.      Left Ear: Tympanic membrane, ear canal and external ear normal.      Nose: Nose normal. No congestion or rhinorrhea.      Mouth/Throat:      Mouth: Mucous membranes are moist.      Pharynx: No oropharyngeal exudate or posterior oropharyngeal erythema.   Eyes:      General: No scleral icterus.        Right eye: No discharge.         Left eye: No discharge.      Extraocular Movements: Extraocular movements intact.      Conjunctiva/sclera: Conjunctivae normal.   Cardiovascular:      Rate and Rhythm: Normal rate and regular rhythm.      Heart sounds: Normal heart sounds. No murmur heard.  Pulmonary:      Effort: Pulmonary effort is normal. No respiratory distress.      Breath sounds: Normal breath sounds. No stridor. No wheezing, rhonchi or rales.   Chest:      Chest wall: No tenderness.   Abdominal:      General: Bowel sounds are normal. There is distension.      Palpations: Abdomen is soft. There is no mass.      Tenderness: There is no abdominal tenderness. There is no guarding or rebound.      Hernia: No hernia is present.   Musculoskeletal:      Right shoulder: Normal.      Left shoulder: No tenderness, bony tenderness or crepitus. Decreased range of motion. Decreased strength.      Cervical back: Normal range of motion and neck supple. No rigidity or tenderness.        Back:       Right lower leg: No edema.      Left lower leg: No edema.   Lymphadenopathy:      Cervical: No cervical adenopathy.   Skin:     General: Skin is warm and dry.   Neurological:      Mental Status: He is alert and oriented to person, place, and time. Mental status is at baseline.   Psychiatric:         Mood and Affect: Mood normal.         Behavior: Behavior normal.         Thought Content: Thought content normal.         Judgment: Judgment normal.                       Assessment and Plan            Medicare annual wellness visit, subsequent  Currently, he does not smoke or use tobacco    Reviewed care gaps and recommended  screening tests with patient.  Patient may need colon cancer screen.  He is unsure if he had colonoscopy and I am not able to easily find any previous colon cancer screening.  Patient does have appointment with oncologist next Friday.  Asked patient to ask oncologist if he had this screening done, as well as oncologist recommendations for further screening.  I anticipate that patient's oncologist may have access to additional EMR from [facility] that may show him last colon cancer screening test.  Immunizations reviewed. Needs Tdap, Shingrix, and Pneumonia.  Prevnar 20 today.  Shingrix unavailable and patient to get this at his pharmacy.  Will defer Tdap for future  Reviewed chronic conditions.  Patient following with appropriate specialists, if needed, with follow-up appointments scheduled.  Medications appropriate. Refills provided as needed.  Advance care planning discussion done with patient/caregiver. he consented to discussion  Patient was provided with POA and Living Will documentation; this were explained to him and he will reivew, sign, and return these forms once complete.         Lipid screening    Orders:    Lipid panel; Future    Need for vaccination    Orders:    Pneumococcal Conjugate Vaccine 20-Valent (PCV20)    Prostate cancer metastatic to bone  Continue follow-up with Dr. Carcamo and Dr. Benjamin in [facility].  To have labs done today including metabolic panel, CBC, PSA.  I was able to review previous labs done with Dr. Carcamo and would be interested to continue to follow those labs as well for condition is not related to prostate cancer.       Chronic pain in left shoulder  Reviewed patient's x-ray with him in depth  Discussed utilizing over-the-counter topical medications to help with intermittent soreness.  Patient not interested in surgery at this time but may consider it in the future.  May also consider physical therapy referral pending frequency and intensity of patient's  pain.       Recurrent deep vein thrombosis  Currently on Xarelto without issues.  Will continue this medication               Follow Up   Return in about 6 months (around 8/25/2025) for Recheck chronic conditions.  Patient was given instructions and counseling regarding his condition or for health maintenance advice. Please see specific information pulled into the AVS if appropriate.

## 2025-02-25 NOTE — ASSESSMENT & PLAN NOTE
Continue follow-up with Dr. Carcamo and Dr. Benjamin in [facility].  To have labs done today including metabolic panel, CBC, PSA.  I was able to review previous labs done with Dr. Carcamo and would be interested to continue to follow those labs as well for condition is not related to prostate cancer.

## 2025-02-25 NOTE — ASSESSMENT & PLAN NOTE
Reviewed patient's x-ray with him in depth  Discussed utilizing over-the-counter topical medications to help with intermittent soreness.  Patient not interested in surgery at this time but may consider it in the future.  May also consider physical therapy referral pending frequency and intensity of patient's pain.

## 2025-03-05 LAB
CHOLEST SERPL-MCNC: 221 MG/DL (ref 0–200)
HDLC SERPL-MCNC: 71 MG/DL (ref 40–60)
LDLC SERPL CALC-MCNC: 134 MG/DL (ref 0–100)
TRIGL SERPL-MCNC: 91 MG/DL (ref 0–150)
VLDLC SERPL CALC-MCNC: 16 MG/DL (ref 5–40)

## 2025-06-06 ENCOUNTER — OFFICE VISIT (OUTPATIENT)
Dept: CARDIOLOGY | Facility: CLINIC | Age: 75
End: 2025-06-06
Payer: MEDICARE

## 2025-06-06 VITALS
HEART RATE: 60 BPM | SYSTOLIC BLOOD PRESSURE: 114 MMHG | DIASTOLIC BLOOD PRESSURE: 78 MMHG | OXYGEN SATURATION: 99 % | BODY MASS INDEX: 21.65 KG/M2 | RESPIRATION RATE: 20 BRPM | WEIGHT: 154.6 LBS | HEIGHT: 71 IN

## 2025-06-06 DIAGNOSIS — R00.2 PALPITATIONS: Primary | ICD-10-CM

## 2025-06-06 DIAGNOSIS — I49.1 PAC (PREMATURE ATRIAL CONTRACTION): ICD-10-CM

## 2025-06-06 PROCEDURE — 99203 OFFICE O/P NEW LOW 30 MIN: CPT | Performed by: INTERNAL MEDICINE

## 2025-06-06 PROCEDURE — 93000 ELECTROCARDIOGRAM COMPLETE: CPT | Performed by: INTERNAL MEDICINE

## 2025-06-06 RX ORDER — DABIGATRAN ETEXILATE 150 MG/1
150 CAPSULE ORAL
COMMUNITY
Start: 2025-04-30 | End: 2025-07-29

## 2025-06-06 NOTE — PROGRESS NOTES
"     [facility] Cardiology OP Consult Note    Ghassan Nichole  [id number]  2025    Referred By: Gerry Man MD    Chief Complaint: Abnormal heart rhythm    History of Present Illness:   Mr. Ghassan Nichole is a 74 y.o. male who presents to the Cardiology Clinic for evaluation of an abnormal heart rhythm.  The patient has a past medical history significant for recurrent DVTs, now chronically anticoagulated.  Upon recent follow-up with his oncologist, a \"abnormal heart rhythm\" was reportedly noted on auscultation.  Today, the patient denies any history of palpitations.  No presyncopal or syncopal events.  No exertional chest pain or chest discomfort.  No specific complaints today.    Past Cardiac Testin. Last Coronary Angio: None  2. Prior Stress Testing: None  3. Last Echo: None  4. Prior Holter Monitor: None    Review of Systems:   Review of Systems   Constitutional:  Negative for activity change, appetite change, chills, diaphoresis, fatigue, fever, unexpected weight gain and unexpected weight loss.   Eyes:  Negative for blurred vision and double vision.   Respiratory:  Negative for cough, chest tightness, shortness of breath and wheezing.    Cardiovascular:  Negative for chest pain, palpitations and leg swelling.   Gastrointestinal:  Negative for abdominal pain, anal bleeding, blood in stool and GERD.   Endocrine: Negative for cold intolerance and heat intolerance.   Genitourinary:  Negative for hematuria.   Neurological:  Negative for dizziness, syncope, weakness and light-headedness.   Hematological:  Does not bruise/bleed easily.   Psychiatric/Behavioral:  Negative for depressed mood and stress. The patient is not nervous/anxious.        Past Medical History:   Past Medical History:   Diagnosis Date    Cancer 2020    Prostate       Past Surgical History: History reviewed. No pertinent surgical history.    Family History:   Family History   Problem Relation Age of Onset    " "Heart disease Father        Social History:   Social History     Socioeconomic History    Marital status:    Tobacco Use    Smoking status: Never     Passive exposure: Never    Smokeless tobacco: Never   Vaping Use    Vaping status: Never Used   Substance and Sexual Activity    Alcohol use: Not Currently    Drug use: Never    Sexual activity: Not Currently       Medications:     Current Outpatient Medications:     abiraterone acetate (ZYTIGA) 250 MG tablet, Take  by mouth., Disp: , Rfl:     dabigatran etexilate (PRADAXA) 150 MG capsu, Take 1 capsule by mouth., Disp: , Rfl:     leuprolide (Eligard) 45 MG injection, Inject 45 mg under the skin into the appropriate area as directed Every 6 (Six) Months., Disp: , Rfl:     predniSONE (DELTASONE) 5 MG tablet, Take 1 tablet by mouth 2 (Two) Times a Day., Disp: , Rfl:     denosumab (XGEVA) 120 MG/1.7ML solution injection, Inject  under the skin into the appropriate area as directed 1 (One) Time., Disp: , Rfl:     Xarelto 20 MG tablet, Take 1 tablet by mouth Every Evening., Disp: , Rfl:     Allergies:   No Known Allergies    Physical Exam:  Vital Signs:   Vitals:    06/06/25 0944   BP: 114/78   BP Location: Left arm   Patient Position: Sitting   Cuff Size: Adult   Pulse: 60   Resp: 20   SpO2: 99%   Weight: 70.1 kg (154 lb 9.6 oz)   Height: 180.3 cm (70.98\")       Physical Exam  Constitutional:       General: He is not in acute distress.     Appearance: Normal appearance. He is not diaphoretic.   HENT:      Head: Normocephalic and atraumatic.   Cardiovascular:      Rate and Rhythm: Normal rate and regular rhythm.      Heart sounds: No murmur heard.  Pulmonary:      Effort: Pulmonary effort is normal. No respiratory distress.      Breath sounds: Normal breath sounds. No stridor. No wheezing, rhonchi or rales.   Abdominal:      General: Bowel sounds are normal. There is no distension.      Palpations: Abdomen is soft.      Tenderness: There is no abdominal tenderness. " There is no guarding or rebound.   Musculoskeletal:         General: No swelling. Normal range of motion.      Cervical back: Neck supple. No tenderness.   Skin:     General: Skin is warm and dry.   Neurological:      General: No focal deficit present.      Mental Status: He is alert and oriented to person, place, and time.   Psychiatric:         Mood and Affect: Mood normal.         Behavior: Behavior normal.         Results Review:   I reviewed the patient's new clinical results.      ECG 12 Lead    Date/Time: 6/6/2025 10:01 AM  Performed by: Gerry Man MD    Authorized by: Gerry Man MD  Comparison: not compared with previous ECG   Rhythm: sinus rhythm  Ectopy: atrial premature contractions  Rate: normal  QRS axis: normal  Clinical impression comment: Normal ECG other than PACs          Assessment / Plan:     1.  PAC (premature atrial contraction)  -- Heart rate irregularity noted on auscultation likely related to PACs  -- ECG today shows occasional PACs, no other significant conduction system abnormalities  -- PACs appear to be asymptomatic  -- While low suspicion for underlying arrhythmia, will proceed with 48-hour monitor to further rule out arrhythmia, assess burden of PACs  -- Follow-up as needed, pending results of cardiac monitoring            Follow Up:   Return if symptoms worsen or fail to improve.      Thank you for allowing me to participate in the care of your patient. Please to not hesitate to contact me with additional questions or concerns.     KENDRA Man MD  Interventional Cardiology   06/06/2025  09:41 EDT

## 2025-06-30 ENCOUNTER — OFFICE VISIT (OUTPATIENT)
Dept: FAMILY MEDICINE CLINIC | Facility: CLINIC | Age: 75
End: 2025-06-30
Payer: MEDICARE

## 2025-06-30 ENCOUNTER — HOSPITAL ENCOUNTER (OUTPATIENT)
Dept: ULTRASOUND IMAGING | Facility: HOSPITAL | Age: 75
Discharge: HOME | End: 2025-06-30
Admitting: NURSE PRACTITIONER
Payer: MEDICARE

## 2025-06-30 VITALS
BODY MASS INDEX: 20.86 KG/M2 | HEIGHT: 71 IN | DIASTOLIC BLOOD PRESSURE: 68 MMHG | RESPIRATION RATE: 16 BRPM | SYSTOLIC BLOOD PRESSURE: 108 MMHG | WEIGHT: 149 LBS | OXYGEN SATURATION: 97 % | HEART RATE: 68 BPM

## 2025-06-30 DIAGNOSIS — R22.41 LOCALIZED SWELLING OF RIGHT LOWER LEG: ICD-10-CM

## 2025-06-30 DIAGNOSIS — I82.409 RECURRENT DEEP VEIN THROMBOSIS: Primary | ICD-10-CM

## 2025-06-30 DIAGNOSIS — I82.409 RECURRENT DEEP VEIN THROMBOSIS: ICD-10-CM

## 2025-06-30 PROCEDURE — 1125F AMNT PAIN NOTED PAIN PRSNT: CPT | Performed by: NURSE PRACTITIONER

## 2025-06-30 PROCEDURE — 93971 EXTREMITY STUDY: CPT

## 2025-06-30 PROCEDURE — 1160F RVW MEDS BY RX/DR IN RCRD: CPT | Performed by: NURSE PRACTITIONER

## 2025-06-30 PROCEDURE — 99213 OFFICE O/P EST LOW 20 MIN: CPT | Performed by: NURSE PRACTITIONER

## 2025-06-30 PROCEDURE — G2211 COMPLEX E/M VISIT ADD ON: HCPCS | Performed by: NURSE PRACTITIONER

## 2025-06-30 PROCEDURE — 1159F MED LIST DOCD IN RCRD: CPT | Performed by: NURSE PRACTITIONER

## 2025-06-30 RX ORDER — PSEUDOEPHED/ACETAMINOPH/DIPHEN 30MG-500MG
TABLET ORAL
COMMUNITY
Start: 2025-06-20

## 2025-06-30 RX ORDER — PENTOXIFYLLINE 400 MG/1
TABLET, EXTENDED RELEASE ORAL
COMMUNITY
Start: 2025-06-24

## 2025-06-30 RX ORDER — CHLORHEXIDINE GLUCONATE ORAL RINSE 1.2 MG/ML
SOLUTION DENTAL
COMMUNITY
Start: 2025-06-23

## 2025-06-30 RX ORDER — MULTIVIT WITH MINERALS/LUTEIN
TABLET ORAL
COMMUNITY
Start: 2025-06-23

## 2025-06-30 RX ORDER — AMOXICILLIN 500 MG/1
1 CAPSULE ORAL 3 TIMES DAILY
COMMUNITY
Start: 2025-06-11

## 2025-06-30 NOTE — PROGRESS NOTES
"                      Established Patient        Chief Complaint:   Chief Complaint   Patient presents with    Leg Swelling     Pt is here for right lower leg pain.          History of Present Illness  The patient presents for evaluation of leg swelling.    He reports swelling in his leg for the past 2-3 days, progressively worsening throughout the day. No associated pain, numbness, or tingling. Full ankle range of motion. Swelling increased around the ankle area after waking. History of blood clots, last in  post-cancer diagnosis. Switched from Xarelto to Pradaxa 2-3 months ago due to cost. Brief interruption in Pradaxa last week due to dental procedure.      Subjective     The following portions of the patient's history were reviewed and updated as appropriate: allergies, current medications, past family history, past medical history, past social history, past surgical history and problem list.    ALLERGIES  No Known Allergies    Review of Systems  As above.     Objective     Vital Signs:   /68   Pulse 68   Resp 16   Ht 180.3 cm (70.98\")   Wt 67.6 kg (149 lb)   SpO2 97%   BMI 20.79 kg/m²     BMI is within normal parameters. No other follow-up for BMI required.          Physical Exam   Physical Exam  Vitals and nursing note reviewed.   HENT:      Mouth/Throat:      Lips: Pink.   Eyes:      General: Lids are normal.   Cardiovascular:      Rate and Rhythm: Normal rate and regular rhythm.      Pulses: Normal pulses.   Pulmonary:      Effort: Pulmonary effort is normal.      Breath sounds: Normal breath sounds.   Musculoskeletal:      Right lower le+ Pitting Edema present.   Skin:     Capillary Refill: Capillary refill takes 2 to 3 seconds.   Neurological:      Mental Status: He is alert and oriented to person, place, and time.      Gait: Gait is intact.   Psychiatric:         Attention and Perception: Attention normal.         Mood and Affect: Mood and affect normal.         Speech: Speech normal.  "        Behavior: Behavior normal. Behavior is cooperative.         Assessment and Plan      Assessment/Plan:   Diagnoses and all orders for this visit:    1. Recurrent deep vein thrombosis (Primary)  -     US Venous Doppler Lower Extremity Right (duplex); Future    2. Localized swelling of right lower leg  -     US Venous Doppler Lower Extremity Right (duplex); Future    I will contact patient regarding test results and provide instructions regarding any necessary changes in plan of care.    Patient was encouraged to keep me informed of any acute changes, lack of improvement, or any new concerning symptoms.      Assessment & Plan  Leg swelling  - Present for 2-3 days without pain, numbness, or tingling  - History of blood clots, last in 2020  - On Pradaxa for 2-3 months, switched from Xarelto due to cost; missed doses last week due to dental procedure  - Ordered ultrasound to rule out complications    Discussion Summary:  Discussed plan of care in detail with pt today; pt verb understanding and agrees.        I have reviewed and updated all copied forward information, as appropriate.  I attest to the accuracy and relevance of any unchanged information.      Follow up:  No follow-ups on file.     Patient Education:  There are no Patient Instructions on file for this visit.    Patient or patient representative verbalized consent for the use of Ambient Listening during the visit with  MERCY Garrett for chart documentation. 6/30/2025  10:36 EDT    MERCY Garrett  06/30/25  13:22 EDT          Please note that portions of this note may have been completed with a voice recognition program.

## 2025-07-02 ENCOUNTER — TELEPHONE (OUTPATIENT)
Dept: FAMILY MEDICINE CLINIC | Facility: CLINIC | Age: 75
End: 2025-07-02
Payer: MEDICARE

## 2025-07-02 NOTE — TELEPHONE ENCOUNTER
Caller: KRISTI EDMONDS    Relationship: Emergency Contact    Best call back number: [phone]     What test was performed: US Venous Doppler Lower Extremity Right     When was the test performed: 06/30/25    Where was the test performed: [facility]    Additional notes:

## 2025-07-07 NOTE — TELEPHONE ENCOUNTER
Patient called for results again regarding results, says they can see them in MyChart but need someone to explain them.

## 2025-07-22 ENCOUNTER — HOSPITAL ENCOUNTER (OUTPATIENT)
Dept: ULTRASOUND IMAGING | Facility: HOSPITAL | Age: 75
Discharge: HOME | End: 2025-07-22
Admitting: FAMILY MEDICINE
Payer: MEDICARE

## 2025-07-22 ENCOUNTER — OFFICE VISIT (OUTPATIENT)
Dept: FAMILY MEDICINE CLINIC | Facility: CLINIC | Age: 75
End: 2025-07-22
Payer: MEDICARE

## 2025-07-22 VITALS
BODY MASS INDEX: 21.03 KG/M2 | HEIGHT: 71 IN | WEIGHT: 150.2 LBS | HEART RATE: 74 BPM | DIASTOLIC BLOOD PRESSURE: 68 MMHG | RESPIRATION RATE: 16 BRPM | OXYGEN SATURATION: 97 % | SYSTOLIC BLOOD PRESSURE: 114 MMHG | TEMPERATURE: 98.6 F

## 2025-07-22 DIAGNOSIS — I82.409 RECURRENT DEEP VEIN THROMBOSIS: ICD-10-CM

## 2025-07-22 DIAGNOSIS — R22.41 LOCALIZED SWELLING OF RIGHT LOWER LEG: ICD-10-CM

## 2025-07-22 DIAGNOSIS — I82.441 ACUTE DEEP VEIN THROMBOSIS (DVT) OF TIBIAL VEIN OF RIGHT LOWER EXTREMITY: ICD-10-CM

## 2025-07-22 DIAGNOSIS — I82.441 ACUTE DEEP VEIN THROMBOSIS (DVT) OF TIBIAL VEIN OF RIGHT LOWER EXTREMITY: Primary | ICD-10-CM

## 2025-07-22 DIAGNOSIS — C61 PROSTATE CANCER METASTATIC TO BONE: ICD-10-CM

## 2025-07-22 DIAGNOSIS — C79.51 PROSTATE CANCER METASTATIC TO BONE: ICD-10-CM

## 2025-07-22 PROCEDURE — 1159F MED LIST DOCD IN RCRD: CPT | Performed by: FAMILY MEDICINE

## 2025-07-22 PROCEDURE — G2211 COMPLEX E/M VISIT ADD ON: HCPCS | Performed by: FAMILY MEDICINE

## 2025-07-22 PROCEDURE — 1160F RVW MEDS BY RX/DR IN RCRD: CPT | Performed by: FAMILY MEDICINE

## 2025-07-22 PROCEDURE — 99214 OFFICE O/P EST MOD 30 MIN: CPT | Performed by: FAMILY MEDICINE

## 2025-07-22 PROCEDURE — 1125F AMNT PAIN NOTED PAIN PRSNT: CPT | Performed by: FAMILY MEDICINE

## 2025-07-22 PROCEDURE — 93971 EXTREMITY STUDY: CPT

## 2025-07-22 NOTE — PROGRESS NOTES
"    Office Note     Name: Ghassan Nichole  : 1950   MRN: [id number]     Chief Complaint:  Leg Swelling (Pt has had swelling in right leg. Pt saw [facility] for this and has had a U/S.)    Subjective        History of Present Illness:  Ghassan Nichole is a 74 y.o. male who presents today for evaluation of leg swelling. He resents with his wife who helps to provide history.    Patient was seen in our clinic on 2025 for right leg swelling.  This has been ongoing for 2-3 days and progressively worsening.  No associated pain, numbness, or tingling.  Patient had switched from Xarelto to Pradaxa 2-3 months ago due to cost and also endured a brief interruption in Pradaxa the week prior to presentation for 1-2 days for a dental procedure.  Patient had ultrasound of right lower extremity done which showed:  1. Acute/subacute thrombus within the tibial peroneal trunk of the calf  vein.   2. 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 Femoral venous segments free of thrombus.  And it was recommended for patient to have follow-up venous duplex to assess for propagation.    25  He continues to reports persistent leg swelling that fluctuates in severity. No interruption in anticoagulation since visit.  However, he did have several questions regarding the ultrasound results.  Continues to deny pain, numbness, or tingling.    I have reviewed the following portions of the patient's history and these were updated and discussed with the patient as appropriate: past family history, past medical history, past social history, past surgical history, and problem list.     Objective     Vital Signs  /68   Pulse 74   Temp 98.6 °F (37 °C) (Oral)   Resp 16   Ht 180.3 cm (70.98\")   Wt 68.1 kg (150 lb 3.2 oz)   SpO2 97%   BMI 20.96 kg/m²   Estimated body mass index is 20.96 kg/m² as calculated from the following:    Height as of this encounter: 180.3 cm (70.98\").    Weight as of this " encounter: 68.1 kg (150 lb 3.2 oz).    Physical Exam  Vitals reviewed.   Constitutional:       General: He is not in acute distress.     Appearance: Normal appearance. He is not ill-appearing.   HENT:      Head: Normocephalic.   Eyes:      Extraocular Movements: Extraocular movements intact.   Cardiovascular:      Rate and Rhythm: Normal rate.   Pulmonary:      Effort: Pulmonary effort is normal.      Breath sounds: Normal breath sounds.   Musculoskeletal:      Right lower leg: Edema present.      Left lower leg: No edema.   Neurological:      Mental Status: He is alert and oriented to person, place, and time.   Psychiatric:         Mood and Affect: Mood normal.         Behavior: Behavior normal.            Assessment and Plan     Diagnoses and all orders for this visit:    1. Acute deep vein thrombosis (DVT) of tibial vein of right lower extremity (Primary)  -     US Venous Doppler Lower Extremity Right (duplex); Future    2. Localized swelling of right lower leg  -     US Venous Doppler Lower Extremity Right (duplex); Future    3. Recurrent deep vein thrombosis  -     US Venous Doppler Lower Extremity Right (duplex); Future    4. Prostate cancer metastatic to bone         - Discussed previous US results in detail. Patient and wife have a good understanding of results.   - However, given persistent swelling, I believe that despite adequate treatment that we need to repeat RLE venous duplex US to assess for propagation.  - Continue taking pradaxa  - Advise adequate hydration and regular leg movement to promote circulation  - May consider switching back to Xarelto or alternative based on imaging results and insurance coverage      BMI is within normal parameters. No other follow-up for BMI required.         Discussion Summary:     Discussed plan of care in detail with patient today. Patient was encouraged to keep me informed of any acute changes, lack of improvement, or any new concerning symptoms.  Patient is also  aware of reasons to seek emergent care. Patient verbalized understanding and agrees with plan of care.    This visit was billed based on MDM.    Follow Up  Return for Next scheduled follow up.    Patient or patient representative verbalized consent for the use of Ambient Listening during the visit with  Jovi Brito MD for chart documentation. 7/22/2025  10:17 EDT    Please note that portions of this note may have been completed with a voice recognition program.     Jovi Brito MD, MPH  [facility] [place]

## 2025-08-25 ENCOUNTER — OFFICE VISIT (OUTPATIENT)
Dept: FAMILY MEDICINE CLINIC | Facility: CLINIC | Age: 75
End: 2025-08-25
Payer: MEDICARE

## 2025-08-25 VITALS
TEMPERATURE: 98.2 F | DIASTOLIC BLOOD PRESSURE: 68 MMHG | HEIGHT: 70 IN | OXYGEN SATURATION: 97 % | HEART RATE: 54 BPM | WEIGHT: 147.8 LBS | SYSTOLIC BLOOD PRESSURE: 118 MMHG | BODY MASS INDEX: 21.16 KG/M2 | RESPIRATION RATE: 16 BRPM

## 2025-08-25 DIAGNOSIS — Z12.11 COLON CANCER SCREENING: ICD-10-CM

## 2025-08-25 DIAGNOSIS — I82.409 RECURRENT DEEP VEIN THROMBOSIS: ICD-10-CM

## 2025-08-25 DIAGNOSIS — M25.512 CHRONIC PAIN IN LEFT SHOULDER: ICD-10-CM

## 2025-08-25 DIAGNOSIS — C61 PROSTATE CANCER METASTATIC TO BONE: ICD-10-CM

## 2025-08-25 DIAGNOSIS — G89.29 CHRONIC PAIN IN LEFT SHOULDER: ICD-10-CM

## 2025-08-25 DIAGNOSIS — R22.41 LOCALIZED SWELLING OF RIGHT LOWER LEG: ICD-10-CM

## 2025-08-25 DIAGNOSIS — C79.51 PROSTATE CANCER METASTATIC TO BONE: ICD-10-CM

## 2025-08-25 DIAGNOSIS — I82.441 ACUTE DEEP VEIN THROMBOSIS (DVT) OF TIBIAL VEIN OF RIGHT LOWER EXTREMITY: Primary | ICD-10-CM
